# Patient Record
Sex: MALE | Race: WHITE | Employment: OTHER | URBAN - METROPOLITAN AREA
[De-identification: names, ages, dates, MRNs, and addresses within clinical notes are randomized per-mention and may not be internally consistent; named-entity substitution may affect disease eponyms.]

---

## 2019-04-26 LAB
CREATININE, EXTERNAL: 0.95
LDL-C, EXTERNAL: 85

## 2019-10-22 LAB
HBA1C MFR BLD HPLC: NORMAL %
MICROALBUMIN UR TEST STR-MCNC: NORMAL MG/DL

## 2020-10-08 VITALS
OXYGEN SATURATION: 98 % | WEIGHT: 218 LBS | DIASTOLIC BLOOD PRESSURE: 80 MMHG | SYSTOLIC BLOOD PRESSURE: 118 MMHG | BODY MASS INDEX: 31.21 KG/M2 | RESPIRATION RATE: 16 BRPM | TEMPERATURE: 97.8 F | HEART RATE: 88 BPM | HEIGHT: 70 IN

## 2020-10-12 ENCOUNTER — OFFICE VISIT (OUTPATIENT)
Dept: FAMILY MEDICINE CLINIC | Age: 75
End: 2020-10-12
Payer: MEDICARE

## 2020-10-12 VITALS
DIASTOLIC BLOOD PRESSURE: 72 MMHG | HEART RATE: 78 BPM | HEIGHT: 70 IN | TEMPERATURE: 97 F | OXYGEN SATURATION: 99 % | SYSTOLIC BLOOD PRESSURE: 112 MMHG | WEIGHT: 217 LBS | BODY MASS INDEX: 31.07 KG/M2

## 2020-10-12 DIAGNOSIS — E11.9 TYPE 2 DIABETES MELLITUS WITHOUT COMPLICATION, WITHOUT LONG-TERM CURRENT USE OF INSULIN (HCC): Primary | ICD-10-CM

## 2020-10-12 DIAGNOSIS — I10 HYPERTENSION, UNSPECIFIED TYPE: ICD-10-CM

## 2020-10-12 DIAGNOSIS — G62.9 NEUROPATHY: ICD-10-CM

## 2020-10-12 DIAGNOSIS — I25.10 CORONARY ARTERIOSCLEROSIS: ICD-10-CM

## 2020-10-12 DIAGNOSIS — E55.9 VITAMIN D DEFICIENCY: ICD-10-CM

## 2020-10-12 DIAGNOSIS — K21.9 GASTROESOPHAGEAL REFLUX DISEASE, UNSPECIFIED WHETHER ESOPHAGITIS PRESENT: ICD-10-CM

## 2020-10-12 DIAGNOSIS — E78.2 MIXED HYPERLIPIDEMIA: ICD-10-CM

## 2020-10-12 PROBLEM — N52.9 PRIMARY ERECTILE DYSFUNCTION: Status: ACTIVE | Noted: 2018-10-01

## 2020-10-12 PROBLEM — M19.90 OSTEOARTHRITIS: Status: ACTIVE | Noted: 2017-04-27

## 2020-10-12 PROBLEM — F10.20 ALCOHOLISM (HCC): Status: ACTIVE | Noted: 2019-10-22

## 2020-10-12 PROBLEM — Z77.090 HISTORY OF ASBESTOS EXPOSURE: Status: ACTIVE | Noted: 2017-02-20

## 2020-10-12 PROBLEM — N20.0 KIDNEY STONE: Status: ACTIVE | Noted: 2018-10-01

## 2020-10-12 PROBLEM — E53.8 VITAMIN B12 DEFICIENCY (NON ANEMIC): Status: ACTIVE | Noted: 2017-02-20

## 2020-10-12 PROCEDURE — 99214 OFFICE O/P EST MOD 30 MIN: CPT | Performed by: STUDENT IN AN ORGANIZED HEALTH CARE EDUCATION/TRAINING PROGRAM

## 2020-10-12 RX ORDER — CLOPIDOGREL BISULFATE 75 MG/1
TABLET ORAL
COMMUNITY
Start: 2020-10-01

## 2020-10-12 RX ORDER — ATORVASTATIN CALCIUM 80 MG/1
TABLET, FILM COATED ORAL
COMMUNITY
Start: 2020-10-01 | End: 2021-01-04

## 2020-10-12 NOTE — PROGRESS NOTES
Subjective:     Chief Complaint   Patient presents with    Follow Up Chronic Condition     HPI:  Lara Munoz is a 67 y.o. male who presents for chronic conditions. Takes Pioglitazone for DM. Previous A1c was 5.6. Recently had albumin/creatinine ratio done and was within normal limits. Compliance with medication: Yes  Home blood glucose monitoring range: Checking inconsistently   Hypoglycemic episode(s): No  Statin: Yes   ACEI: No   ASA: yes   Ophthalmology: Yes, regularly, mo changes in vision  Podiatry: No  Dentistry: Yes, follows  Compliance with diet:No  Polyphagia/Polydypsia: No    Not checking blood pressure consistently at home. Recently had it checked and it was normal.    History of GERD. Was previously on Prilosec. Currently only taking Tums as needed. Overall well controlled. Complains of neuropathy in his left hand, and bilateral feet. States need occurs at night when he is lying down, will resolve quickly when he gets up. Is not very bothersome, and he does not want to see neurology. Patient has history of NSTEMI, follows with cardiology. Metoprolol is on his medication list but is not taking this. Went over the cardiology notes, and there was no instructions to take this medication. He is scheduled to see cardiology in December, and will talk to him about this. He is on Plavix, aspirin, statin.     Past Medical History:   Diagnosis Date    [facility] non-[facility]) [facility]     Alcoholism ([facility] 10/22/2019    Chest pain     Coronary arteriosclerosis     Diabetes (HCC)     Type II, no insulin    Elevated PSA     GERD (gastroesophageal reflux disease)     History of asbestos exposure 2/20/2017    Hypertension     Kidney stone 10/1/2018    Kidney stones     Mixed hyperlipidemia 2/20/2017    Osteoarthritis 4/27/2017    Presence of coronary angioplasty implant and graft 9/1/2016    Primary erectile dysfunction 10/1/2018    Prostate cancer ([facility]     Type 2 diabetes mellitus without complication, without long-term current use of insulin ([facility] 2017    Vitamin B12 deficiency (non anemic) 2017    Wears dentures     upper     Family History   Problem Relation Age of Onset    Heart Disease Mother     Cancer Father         leg cancer that spread     Social History     Socioeconomic History    Marital status:      Spouse name: Not on file    Number of children: Not on file    Years of education: Not on file    Highest education level: Not on file   Occupational History    Not on file   Social Needs    Financial resource strain: Not on file    Food insecurity     Worry: Not on file     Inability: Not on file    Transportation needs     Medical: Not on file     Non-medical: Not on file   Tobacco Use    Smoking status: Current Some Day Smoker     Packs/day: 2.00     Years: 50.00     Pack years: 100.00     Last attempt to quit: 2007     Years since quittin.7    Smokeless tobacco: Former User   Substance and Sexual Activity    Alcohol use:  Yes     Alcohol/week: 7.0 standard drinks     Types: 7 Cans of beer per week    Drug use: Never    Sexual activity: Not on file   Lifestyle    Physical activity     Days per week: Not on file     Minutes per session: Not on file    Stress: Not on file   Relationships    Social connections     Talks on phone: Not on file     Gets together: Not on file     Attends Rastafarian service: Not on file     Active member of club or organization: Not on file     Attends meetings of clubs or organizations: Not on file     Relationship status: Not on file    Intimate partner violence     Fear of current or ex partner: Not on file     Emotionally abused: Not on file     Physically abused: Not on file     Forced sexual activity: Not on file   Other Topics Concern    Not on file   Social History Narrative    Not on file     Current Outpatient Medications on File Prior to Visit   Medication Sig Dispense Refill    VITAMIN B COMPLEX PO Take 1 Tab by mouth daily.  aspirin-calcium carbonate 81 mg-300 mg calcium(777 mg) tab Take 81 mg by mouth daily.  atorvastatin (LIPITOR) 80 mg tablet take 1 tablet by mouth once daily      clopidogreL (PLAVIX) 75 mg tab take 1 tablet by mouth once daily      nitroglycerin (NITROSTAT) 0.4 mg SL tablet 0.4 mg by SubLINGual route every five (5) minutes as needed for Chest Pain. Chest pain      ergocalciferol (ERGOCALCIFEROL) 50,000 unit capsule Take 50,000 Units by mouth every thirty (30) days.  pioglitazone (ACTOS) 30 mg tablet Take 30 mg by mouth daily.  [DISCONTINUED] metoprolol (LOPRESSOR) 25 mg tablet Take 25 mg by mouth two (2) times a day.  [DISCONTINUED] pantoprazole (PROTONIX) 40 mg tablet Take 40 mg by mouth two (2) times a day.  [DISCONTINUED] rosuvastatin (CRESTOR) 20 mg tablet Take 20 mg by mouth nightly. No current facility-administered medications on file prior to visit. No Known Allergies  Review of Systems   Constitutional: Negative for chills and fever. Respiratory: Negative for cough. Cardiovascular: Negative for chest pain. Gastrointestinal: Positive for heartburn. Neurological:        As stated in HPI           Objective:     Vitals:    10/12/20 1038   BP: 112/72   Pulse: 78   Temp: 97 °F (36.1 °C)   TempSrc: Temporal   SpO2: 99%   Weight: 217 lb (98.4 kg)   Height: 5' 9.5\" (1.765 m)     Physical Exam  Vitals signs reviewed. Constitutional:       Appearance: Normal appearance. HENT:      Head: Normocephalic and atraumatic. Neck:      Musculoskeletal: Neck supple. Cardiovascular:      Rate and Rhythm: Normal rate and regular rhythm. Heart sounds: No murmur. Pulmonary:      Effort: Pulmonary effort is normal.      Breath sounds: Normal breath sounds. No wheezing. Abdominal:      General: Abdomen is flat. Palpations: Abdomen is soft. Tenderness:  There is no abdominal tenderness. Skin:     General: Skin is warm and dry. Neurological:      Mental Status: He is alert and oriented to person, place, and time. Mental status is at baseline. Comments: Left wrist: Negative Tinel, negative Phalen    Left elbow: Negative cubital tunnel test    Normal monofilament test and see bilaterally. Psychiatric:         Mood and Affect: Mood normal.            Assessment/Plan:       1. Type 2 diabetes mellitus without complication, without long-term current use of insulin ([address]      -     Continue current management. Follow-up labs. Advised diet (provided handout) and exercise. -     METABOLIC PANEL, COMPREHENSIVE  -     HEMOGLOBIN A1C WITH EAG  -      DIABETES FOOT EXAM    2. Gastroesophageal reflux disease, unspecified whether esophagitis present  -     Continue current management. Can re, or H2 antagonist as needed. Add a PPI    3. Vitamin D deficiency  -     Continue current management. Follow-up labs. She    4. Neuropathy  -     TSH 3RD GENERATION  -     CBC WITH AUTOMATED DIFF  -     METABOLIC PANEL, COMPREHENSIVE  -     VITAMIN B12  -     MAGNESIUM  -     SED RATE (ESR)    5. Mixed hyperlipidemia  -     METABOLIC PANEL, COMPREHENSIVE  -     LIPID PANEL    6. Coronary arteriosclerosis  -Follow-up with cardiology. Will ask about beta blocker. 7. Hypertension, unspecified type  -     Continue current management. Follow-up labs. Advised to check blood pressure at least 3 times weekly. Follow-up and Dispositions    · Return in about 6 months (around 4/12/2021) for Wellness.          Shante Berman MD

## 2020-10-12 NOTE — PATIENT INSTRUCTIONS

## 2020-10-13 LAB
ALBUMIN SERPL-MCNC: 4.3 G/DL (ref 3.7–4.7)
ALBUMIN/GLOB SERPL: 1.8 {RATIO} (ref 1.2–2.2)
ALP SERPL-CCNC: 85 IU/L (ref 39–117)
ALT SERPL-CCNC: 14 IU/L (ref 0–44)
AST SERPL-CCNC: 15 IU/L (ref 0–40)
BASOPHILS # BLD AUTO: 0.1 X10E3/UL (ref 0–0.2)
BASOPHILS NFR BLD AUTO: 1 %
BILIRUB SERPL-MCNC: 0.5 MG/DL (ref 0–1.2)
BUN SERPL-MCNC: 12 MG/DL (ref 8–27)
BUN/CREAT SERPL: 15 (ref 10–24)
CALCIUM SERPL-MCNC: 9.6 MG/DL (ref 8.6–10.2)
CHLORIDE SERPL-SCNC: 102 MMOL/L (ref 96–106)
CHOLEST SERPL-MCNC: 133 MG/DL (ref 100–199)
CO2 SERPL-SCNC: 25 MMOL/L (ref 20–29)
CREAT SERPL-MCNC: 0.82 MG/DL (ref 0.76–1.27)
EOSINOPHIL # BLD AUTO: 0 X10E3/UL (ref 0–0.4)
EOSINOPHIL NFR BLD AUTO: 1 %
ERYTHROCYTE [DISTWIDTH] IN BLOOD BY AUTOMATED COUNT: 13.4 % (ref 11.6–15.4)
ERYTHROCYTE [SEDIMENTATION RATE] IN BLOOD BY WESTERGREN METHOD: 13 MM/HR (ref 0–30)
EST. AVERAGE GLUCOSE BLD GHB EST-MCNC: 111 MG/DL
GLOBULIN SER CALC-MCNC: 2.4 G/DL (ref 1.5–4.5)
GLUCOSE SERPL-MCNC: 88 MG/DL (ref 65–99)
HBA1C MFR BLD: 5.5 % (ref 4.8–5.6)
HCT VFR BLD AUTO: 46.1 % (ref 37.5–51)
HDLC SERPL-MCNC: 40 MG/DL
HGB BLD-MCNC: 15.3 G/DL (ref 13–17.7)
IMM GRANULOCYTES # BLD AUTO: 0 X10E3/UL (ref 0–0.1)
IMM GRANULOCYTES NFR BLD AUTO: 0 %
LDLC SERPL CALC-MCNC: 76 MG/DL (ref 0–99)
LYMPHOCYTES # BLD AUTO: 1.3 X10E3/UL (ref 0.7–3.1)
LYMPHOCYTES NFR BLD AUTO: 27 %
MAGNESIUM SERPL-MCNC: 2.1 MG/DL (ref 1.6–2.3)
MCH RBC QN AUTO: 30.7 PG (ref 26.6–33)
MCHC RBC AUTO-ENTMCNC: 33.2 G/DL (ref 31.5–35.7)
MCV RBC AUTO: 92 FL (ref 79–97)
MONOCYTES # BLD AUTO: 0.4 X10E3/UL (ref 0.1–0.9)
MONOCYTES NFR BLD AUTO: 9 %
NEUTROPHILS # BLD AUTO: 3 X10E3/UL (ref 1.4–7)
NEUTROPHILS NFR BLD AUTO: 62 %
PLATELET # BLD AUTO: 252 X10E3/UL (ref 150–450)
POTASSIUM SERPL-SCNC: 4.4 MMOL/L (ref 3.5–5.2)
PROT SERPL-MCNC: 6.7 G/DL (ref 6–8.5)
RBC # BLD AUTO: 4.99 X10E6/UL (ref 4.14–5.8)
SODIUM SERPL-SCNC: 141 MMOL/L (ref 134–144)
TRIGL SERPL-MCNC: 86 MG/DL (ref 0–149)
TSH SERPL DL<=0.005 MIU/L-ACNC: 2.2 UIU/ML (ref 0.45–4.5)
VIT B12 SERPL-MCNC: 457 PG/ML (ref 232–1245)
VLDLC SERPL CALC-MCNC: 17 MG/DL (ref 5–40)
WBC # BLD AUTO: 4.8 X10E3/UL (ref 3.4–10.8)

## 2020-10-17 LAB
25(OH)D3+25(OH)D2 SERPL-MCNC: 38.1 NG/ML (ref 30–100)
SPECIMEN STATUS REPORT, ROLRST: NORMAL

## 2021-04-12 ENCOUNTER — OFFICE VISIT (OUTPATIENT)
Dept: FAMILY MEDICINE CLINIC | Age: 76
End: 2021-04-12
Payer: MEDICARE

## 2021-04-12 VITALS
WEIGHT: 222 LBS | BODY MASS INDEX: 32.31 KG/M2 | SYSTOLIC BLOOD PRESSURE: 115 MMHG | HEART RATE: 65 BPM | TEMPERATURE: 96.9 F | DIASTOLIC BLOOD PRESSURE: 70 MMHG | OXYGEN SATURATION: 98 %

## 2021-04-12 DIAGNOSIS — Z13.39 ALCOHOL SCREENING: ICD-10-CM

## 2021-04-12 DIAGNOSIS — F10.20 ALCOHOLISM (HCC): ICD-10-CM

## 2021-04-12 DIAGNOSIS — Z00.00 WELLNESS EXAMINATION: Primary | ICD-10-CM

## 2021-04-12 DIAGNOSIS — E11.9 TYPE 2 DIABETES MELLITUS WITHOUT COMPLICATION, WITHOUT LONG-TERM CURRENT USE OF INSULIN (HCC): ICD-10-CM

## 2021-04-12 DIAGNOSIS — E78.00 PURE HYPERCHOLESTEROLEMIA: ICD-10-CM

## 2021-04-12 DIAGNOSIS — Z95.5 S/P CORONARY ARTERY STENT PLACEMENT: ICD-10-CM

## 2021-04-12 DIAGNOSIS — Z13.31 DEPRESSION SCREENING: ICD-10-CM

## 2021-04-12 DIAGNOSIS — Z11.59 ENCOUNTER FOR HEPATITIS C SCREENING TEST FOR LOW RISK PATIENT: ICD-10-CM

## 2021-04-12 DIAGNOSIS — I25.10 CORONARY ARTERIOSCLEROSIS: ICD-10-CM

## 2021-04-12 PROCEDURE — 1101F PT FALLS ASSESS-DOCD LE1/YR: CPT | Performed by: STUDENT IN AN ORGANIZED HEALTH CARE EDUCATION/TRAINING PROGRAM

## 2021-04-12 PROCEDURE — G8510 SCR DEP NEG, NO PLAN REQD: HCPCS | Performed by: STUDENT IN AN ORGANIZED HEALTH CARE EDUCATION/TRAINING PROGRAM

## 2021-04-12 PROCEDURE — G0439 PPPS, SUBSEQ VISIT: HCPCS | Performed by: STUDENT IN AN ORGANIZED HEALTH CARE EDUCATION/TRAINING PROGRAM

## 2021-04-12 PROCEDURE — G8754 DIAS BP LESS 90: HCPCS | Performed by: STUDENT IN AN ORGANIZED HEALTH CARE EDUCATION/TRAINING PROGRAM

## 2021-04-12 PROCEDURE — G8427 DOCREV CUR MEDS BY ELIG CLIN: HCPCS | Performed by: STUDENT IN AN ORGANIZED HEALTH CARE EDUCATION/TRAINING PROGRAM

## 2021-04-12 PROCEDURE — G8417 CALC BMI ABV UP PARAM F/U: HCPCS | Performed by: STUDENT IN AN ORGANIZED HEALTH CARE EDUCATION/TRAINING PROGRAM

## 2021-04-12 PROCEDURE — G8536 NO DOC ELDER MAL SCRN: HCPCS | Performed by: STUDENT IN AN ORGANIZED HEALTH CARE EDUCATION/TRAINING PROGRAM

## 2021-04-12 PROCEDURE — G8752 SYS BP LESS 140: HCPCS | Performed by: STUDENT IN AN ORGANIZED HEALTH CARE EDUCATION/TRAINING PROGRAM

## 2021-04-12 NOTE — PROGRESS NOTES
Medicare Wellness Exam:    Chief Complaint   Patient presents with    Labs     6 month f/ufor blood work, cholesterol & sugar    Annual Wellness Visit     Medicare Wellness     he is a 67y.o. year old male who presents for evaluation for their Medicare Wellness Visit. Fall Screen is completed and assessed=yes. Depression Screen is completed and assessed=yes  Medication list reviewed and adjusted for accuracy=yes  Immunizations reviewed and updated=yes  Health/Preventative Screenings reviewed and updated=yes  ADL Functions reviewed=yes  MiniCog Score= 5/5 (2 points for clock and 3/3 points for recall)   See scanned medicare wellness documents for full details. Patient Active Problem List    Diagnosis    Acute non-ST elevation myocardial infarction ([facility]STEMI) ([facility]    Chest pain    Coronary arteriosclerosis    Hypertension    Alcoholism ([facility]    Kidney stone    Primary erectile dysfunction    Osteoarthritis    Vitamin B12 deficiency (non anemic)    History of asbestos exposure    Mixed hyperlipidemia    Type 2 diabetes mellitus without complication, without long-term current use of insulin (HCC)    Presence of coronary angioplasty implant and graft       Reviewed PmHx, RxHx, FmHx, SocHx, AllgHx and updated and dated in the chart. Not checking blood pressure consistently at home. Recently had it checked and it was normal.     History of GERD. Was previously on Prilosec. Currently only taking Tums as needed. Overall well controlled.     Complains of neuropathy in his left hand, and bilateral feet. States need occurs at night when he is lying down, will resolve quickly when he gets up. Is not very bothersome, and he does not want to see neurology.     Patient has history of chest pain, CAD, had stent placed in LCx in 2016. Follows with cardiology. Recently Plavix was stopped. He is currently on statin, aspirin, and as needed Nitrostat. Has not needed Nitrostat in a long time.     He abuses alcohol. Drinks about 6 beers a day. He is not interested in quitting. None    Refuses all vaccinations including pneumonia, Covid, Tdap, flu. History of diabetes on pioglitazone. Last A1c was 5.5. Interested in starting medication if A1c remains uncontrolled. Review of Systems   All other systems reviewed and are negative. Objective:     Vitals:    04/12/21 0853   BP: 115/70   Pulse: 65   Temp: 96.9 °F (36.1 °C)   TempSrc: Temporal   SpO2: 98%   Weight: 222 lb (100.7 kg)     Physical Exam  Vitals signs reviewed. Constitutional:       Appearance: Normal appearance. HENT:      Head: Normocephalic and atraumatic. Cardiovascular:      Rate and Rhythm: Normal rate. Heart sounds: Normal heart sounds. Pulmonary:      Effort: Pulmonary effort is normal.      Breath sounds: Normal breath sounds. Abdominal:      Palpations: Abdomen is soft. Tenderness: There is no abdominal tenderness. Neurological:      Mental Status: He is alert and oriented to person, place, and time. Psychiatric:         Behavior: Behavior normal.          Assessment/ Plan:       Wellness examination    -     Update HCM. Follow-up labs. Refusing all vaccinations. Alcoholism ([address]  -Discussed alcohol intake which includes about 6 beers a day. At this point is not interested in quitting. Type 2 diabetes mellitus without complication, without long-term current use of insulin ([address]        -     May stop pioglitazone of A1c continues controlled  -     CBC WITH AUTOMATED DIFF  -     METABOLIC PANEL, COMPREHENSIVE  -     HEMOGLOBIN A1C WITH EAG    Depression screening    Alcohol screening     Coronary arteriosclerosis  S/P coronary artery stent placement in 2016  -Heart recently stopped. Currently on statin and aspirin. Rarely uses Nitrostat, has not used in a long time.   -     Follows with cardiology  -     LIPID PANEL     Pure hypercholesterolemia  -     CBC WITH AUTOMATED DIFF  -     LIPID PANEL    Hypertension, unspecified type  -     Continue current management. Follow-up labs. Advised to check blood pressure at least 3 times weekly. Follow-up and Dispositions    · Return in about 6 months (around 10/12/2021) for Chronic Conditions.                -Pain evaluation performed in office  -Cognitive Screen performed in office  -Depression Screen, Fall risks  and ADL functionality were addressed  -Medication list updated and reviewed for any changes   -A comprehensive review of medical issues and a plan was formulated  -End of life planning was addressed with pt   -Health Screenings for preventions were addressed and a plan was formulated  -Shingles Vaccine was recommended  -Discussed with patient cancer risk factors and appropriate screenings for age  -Patient evaluated for colonoscopy and referred if needed per screeing criteria  -Labs from previous visits were discussed with patient   -Discussed with patient diet and exercise and formulated a plan as needed  -Advanced care planning was addressed with the patient.  -Alcohol screening performed and was negative    -    I have discussed the diagnosis with the patient and the intended plan as seen in the above orders. The patient understands and agrees with the plan. The patient has received an after-visit summary and questions were answered concerning future plans. Medication Side Effects and Warnings were discussed with patien  Patient Labs were reviewed and or requested  Patient Past Records were reviewed and or requested    There are no Patient Instructions on file for this visit.       Tyler Henderson MD

## 2021-04-12 NOTE — PROGRESS NOTES
Chief Complaint   Patient presents with    Labs     6 month f/ufor blood work, cholesterol & sugar     1. Have you been to the ER, urgent care clinic since your last visit? Hospitalized since your last visit?no    2. Have you seen or consulted any other health care providers outside of the [address] [place] [address] since your last visit? Include any pap smears or colon screening.  No     3 most recent PHQ Screens 4/12/2021   Little interest or pleasure in doing things Not at all   Feeling down, depressed, irritable, or hopeless Not at all   Total Score PHQ 2 0

## 2021-04-12 NOTE — PATIENT INSTRUCTIONS

## 2021-04-13 LAB
ALBUMIN SERPL-MCNC: 4.2 G/DL (ref 3.7–4.7)
ALBUMIN/GLOB SERPL: 2 {RATIO} (ref 1.2–2.2)
ALP SERPL-CCNC: 71 IU/L (ref 39–117)
ALT SERPL-CCNC: 16 IU/L (ref 0–44)
AST SERPL-CCNC: 20 IU/L (ref 0–40)
BASOPHILS # BLD AUTO: 0 X10E3/UL (ref 0–0.2)
BASOPHILS NFR BLD AUTO: 1 %
BILIRUB SERPL-MCNC: 0.5 MG/DL (ref 0–1.2)
BUN SERPL-MCNC: 11 MG/DL (ref 8–27)
BUN/CREAT SERPL: 15 (ref 10–24)
CALCIUM SERPL-MCNC: 9.5 MG/DL (ref 8.6–10.2)
CHLORIDE SERPL-SCNC: 102 MMOL/L (ref 96–106)
CHOLEST SERPL-MCNC: 119 MG/DL (ref 100–199)
CO2 SERPL-SCNC: 24 MMOL/L (ref 20–29)
CREAT SERPL-MCNC: 0.74 MG/DL (ref 0.76–1.27)
EOSINOPHIL # BLD AUTO: 0.1 X10E3/UL (ref 0–0.4)
EOSINOPHIL NFR BLD AUTO: 1 %
ERYTHROCYTE [DISTWIDTH] IN BLOOD BY AUTOMATED COUNT: 13.6 % (ref 11.6–15.4)
EST. AVERAGE GLUCOSE BLD GHB EST-MCNC: 111 MG/DL
GLOBULIN SER CALC-MCNC: 2.1 G/DL (ref 1.5–4.5)
GLUCOSE SERPL-MCNC: 92 MG/DL (ref 65–99)
HBA1C MFR BLD: 5.5 % (ref 4.8–5.6)
HCT VFR BLD AUTO: 43.6 % (ref 37.5–51)
HDLC SERPL-MCNC: 36 MG/DL
HGB BLD-MCNC: 14.8 G/DL (ref 13–17.7)
IMM GRANULOCYTES # BLD AUTO: 0 X10E3/UL (ref 0–0.1)
IMM GRANULOCYTES NFR BLD AUTO: 0 %
LDLC SERPL CALC-MCNC: 68 MG/DL (ref 0–99)
LYMPHOCYTES # BLD AUTO: 0.9 X10E3/UL (ref 0.7–3.1)
LYMPHOCYTES NFR BLD AUTO: 22 %
MCH RBC QN AUTO: 31.7 PG (ref 26.6–33)
MCHC RBC AUTO-ENTMCNC: 33.9 G/DL (ref 31.5–35.7)
MCV RBC AUTO: 93 FL (ref 79–97)
MONOCYTES # BLD AUTO: 0.4 X10E3/UL (ref 0.1–0.9)
MONOCYTES NFR BLD AUTO: 9 %
NEUTROPHILS # BLD AUTO: 2.7 X10E3/UL (ref 1.4–7)
NEUTROPHILS NFR BLD AUTO: 67 %
PLATELET # BLD AUTO: 264 X10E3/UL (ref 150–450)
POTASSIUM SERPL-SCNC: 4.1 MMOL/L (ref 3.5–5.2)
PROT SERPL-MCNC: 6.3 G/DL (ref 6–8.5)
RBC # BLD AUTO: 4.67 X10E6/UL (ref 4.14–5.8)
SODIUM SERPL-SCNC: 141 MMOL/L (ref 134–144)
TRIGL SERPL-MCNC: 70 MG/DL (ref 0–149)
VLDLC SERPL CALC-MCNC: 15 MG/DL (ref 5–40)
WBC # BLD AUTO: 4 X10E3/UL (ref 3.4–10.8)

## 2021-09-26 RX ORDER — ERGOCALCIFEROL 1.25 MG/1
CAPSULE ORAL
Qty: 3 CAPSULE | Refills: 0 | Status: SHIPPED | OUTPATIENT
Start: 2021-09-26 | End: 2021-12-26

## 2021-10-03 DIAGNOSIS — E11.9 TYPE 2 DIABETES MELLITUS WITHOUT COMPLICATION, WITHOUT LONG-TERM CURRENT USE OF INSULIN (HCC): Primary | ICD-10-CM

## 2021-10-04 RX ORDER — PIOGLITAZONEHYDROCHLORIDE 30 MG/1
TABLET ORAL
Qty: 90 TABLET | Refills: 1 | Status: SHIPPED | OUTPATIENT
Start: 2021-10-04 | End: 2022-04-02

## 2021-12-26 RX ORDER — ERGOCALCIFEROL 1.25 MG/1
CAPSULE ORAL
Qty: 3 CAPSULE | Refills: 0 | Status: SHIPPED | OUTPATIENT
Start: 2021-12-26 | End: 2022-03-28

## 2022-01-02 RX ORDER — ATORVASTATIN CALCIUM 80 MG/1
TABLET, FILM COATED ORAL
Qty: 90 TABLET | Refills: 3 | Status: SHIPPED | OUTPATIENT
Start: 2022-01-02

## 2022-02-14 ENCOUNTER — NURSE TRIAGE (OUTPATIENT)
Dept: OTHER | Facility: CLINIC | Age: 77
End: 2022-02-14

## 2022-02-14 NOTE — TELEPHONE ENCOUNTER
Received call from [facility] at [facility] with Red Flag Complaint. Subjective: Caller states \"SOB, chest congestion\"     Current Symptoms:   Chest congestion  SOB and wheezing until he coughs up white phlegm  \"Rattling\" with breathing   Frequent coughing episodes, every 15 minutes  Weakness  Muscle aches    Onset:  yesterday; worsening overnight     Associated Symptoms: NA    Pain Severity: denies    Temperature: denies     What has been tried: inhaler-helping a little bit    LMP: NA Pregnant: NA    Recommended disposition: go to office now, advised caller if unable to get an appointment in the suggested time frame to go to an [address], caller agreeable. Care advice provided, patient verbalizes understanding; denies any other questions or concerns; instructed to call back for any new or worsening symptoms. Writer provided warm transfer to Page Santiago at [facility] for appointment scheduling. Attention Provider: Thank you for allowing me to participate in the care of your patient. The patient was connected to triage in response to information provided to the [facility]. Please do not respond through this encounter as the response is not directed to a shared pool.     Reason for Disposition   Wheezing is present    Protocols used: [id number]

## 2022-03-18 PROBLEM — F10.20 ALCOHOLISM (HCC): Status: ACTIVE | Noted: 2019-10-22

## 2022-03-18 PROBLEM — N52.9 PRIMARY ERECTILE DYSFUNCTION: Status: ACTIVE | Noted: 2018-10-01

## 2022-03-19 PROBLEM — N20.0 KIDNEY STONE: Status: ACTIVE | Noted: 2018-10-01

## 2022-03-19 PROBLEM — M19.90 OSTEOARTHRITIS: Status: ACTIVE | Noted: 2017-04-27

## 2022-03-19 PROBLEM — E53.8 VITAMIN B12 DEFICIENCY (NON ANEMIC): Status: ACTIVE | Noted: 2017-02-20

## 2022-03-19 PROBLEM — E78.2 MIXED HYPERLIPIDEMIA: Status: ACTIVE | Noted: 2017-02-20

## 2022-03-19 PROBLEM — E11.9 TYPE 2 DIABETES MELLITUS WITHOUT COMPLICATION, WITHOUT LONG-TERM CURRENT USE OF INSULIN (HCC): Status: ACTIVE | Noted: 2017-02-20

## 2022-03-20 PROBLEM — Z77.090 HISTORY OF ASBESTOS EXPOSURE: Status: ACTIVE | Noted: 2017-02-20

## 2022-03-28 RX ORDER — ERGOCALCIFEROL 1.25 MG/1
CAPSULE ORAL
Qty: 3 CAPSULE | Refills: 0 | Status: SHIPPED | OUTPATIENT
Start: 2022-03-28 | End: 2022-06-24

## 2022-04-02 DIAGNOSIS — E11.9 TYPE 2 DIABETES MELLITUS WITHOUT COMPLICATION, WITHOUT LONG-TERM CURRENT USE OF INSULIN (HCC): ICD-10-CM

## 2022-04-02 RX ORDER — PIOGLITAZONEHYDROCHLORIDE 30 MG/1
TABLET ORAL
Qty: 90 TABLET | Refills: 1 | Status: SHIPPED | OUTPATIENT
Start: 2022-04-02 | End: 2022-09-28

## 2022-06-24 RX ORDER — ERGOCALCIFEROL 1.25 MG/1
CAPSULE ORAL
Qty: 3 CAPSULE | Refills: 0 | Status: SHIPPED | OUTPATIENT
Start: 2022-06-24 | End: 2022-09-22

## 2022-09-22 RX ORDER — ERGOCALCIFEROL 1.25 MG/1
CAPSULE ORAL
Qty: 3 CAPSULE | Refills: 0 | Status: SHIPPED | OUTPATIENT
Start: 2022-09-22

## 2022-09-23 ENCOUNTER — TELEPHONE (OUTPATIENT)
Dept: FAMILY MEDICINE CLINIC | Age: 77
End: 2022-09-23

## 2022-09-23 NOTE — TELEPHONE ENCOUNTER
----- Message from Fermin Barbosa sent at 9/23/2022 11:54 AM EDT -----  Subject: Referral Request    Reason for referral request? labs  Provider patient wants to be referred to(if known):     Provider Phone Number(if known):     Additional Information for Provider?   ---------------------------------------------------------------------------  --------------  [address]    [phone]; OK to leave message on voicemail  ---------------------------------------------------------------------------  --------------

## 2022-09-23 NOTE — TELEPHONE ENCOUNTER
Left message advising pt that he is scheduled for a visit on 9/26/22 at 8:30 and to arrive 15min prior and that Dr. Tee Cortez would be doing lab work at that appointment.

## 2022-09-26 ENCOUNTER — OFFICE VISIT (OUTPATIENT)
Dept: FAMILY MEDICINE CLINIC | Age: 77
End: 2022-09-26
Payer: MEDICARE

## 2022-09-26 VITALS
BODY MASS INDEX: 31.78 KG/M2 | SYSTOLIC BLOOD PRESSURE: 138 MMHG | RESPIRATION RATE: 16 BRPM | WEIGHT: 222 LBS | DIASTOLIC BLOOD PRESSURE: 73 MMHG | HEIGHT: 70 IN | HEART RATE: 91 BPM | TEMPERATURE: 97.8 F

## 2022-09-26 DIAGNOSIS — Z12.5 SCREENING PSA (PROSTATE SPECIFIC ANTIGEN): ICD-10-CM

## 2022-09-26 DIAGNOSIS — I25.10 CORONARY ARTERIOSCLEROSIS: ICD-10-CM

## 2022-09-26 DIAGNOSIS — Z11.59 NEED FOR HEPATITIS C SCREENING TEST: ICD-10-CM

## 2022-09-26 DIAGNOSIS — E78.2 MIXED HYPERLIPIDEMIA: ICD-10-CM

## 2022-09-26 DIAGNOSIS — R32 URINARY INCONTINENCE, UNSPECIFIED TYPE: ICD-10-CM

## 2022-09-26 DIAGNOSIS — M19.90 ARTHRITIS: ICD-10-CM

## 2022-09-26 DIAGNOSIS — I21.4 ACUTE NON-ST ELEVATION MYOCARDIAL INFARCTION (NSTEMI) (HCC): ICD-10-CM

## 2022-09-26 DIAGNOSIS — F10.20 ALCOHOLISM (HCC): ICD-10-CM

## 2022-09-26 DIAGNOSIS — E53.8 VITAMIN B12 DEFICIENCY (NON ANEMIC): ICD-10-CM

## 2022-09-26 DIAGNOSIS — E11.9 TYPE 2 DIABETES MELLITUS WITHOUT COMPLICATION, WITHOUT LONG-TERM CURRENT USE OF INSULIN (HCC): Primary | ICD-10-CM

## 2022-09-26 PROCEDURE — G8427 DOCREV CUR MEDS BY ELIG CLIN: HCPCS | Performed by: STUDENT IN AN ORGANIZED HEALTH CARE EDUCATION/TRAINING PROGRAM

## 2022-09-26 PROCEDURE — G8752 SYS BP LESS 140: HCPCS | Performed by: STUDENT IN AN ORGANIZED HEALTH CARE EDUCATION/TRAINING PROGRAM

## 2022-09-26 PROCEDURE — G8510 SCR DEP NEG, NO PLAN REQD: HCPCS | Performed by: STUDENT IN AN ORGANIZED HEALTH CARE EDUCATION/TRAINING PROGRAM

## 2022-09-26 PROCEDURE — 1101F PT FALLS ASSESS-DOCD LE1/YR: CPT | Performed by: STUDENT IN AN ORGANIZED HEALTH CARE EDUCATION/TRAINING PROGRAM

## 2022-09-26 PROCEDURE — G8536 NO DOC ELDER MAL SCRN: HCPCS | Performed by: STUDENT IN AN ORGANIZED HEALTH CARE EDUCATION/TRAINING PROGRAM

## 2022-09-26 PROCEDURE — G8417 CALC BMI ABV UP PARAM F/U: HCPCS | Performed by: STUDENT IN AN ORGANIZED HEALTH CARE EDUCATION/TRAINING PROGRAM

## 2022-09-26 PROCEDURE — 1123F ACP DISCUSS/DSCN MKR DOCD: CPT | Performed by: STUDENT IN AN ORGANIZED HEALTH CARE EDUCATION/TRAINING PROGRAM

## 2022-09-26 PROCEDURE — G8754 DIAS BP LESS 90: HCPCS | Performed by: STUDENT IN AN ORGANIZED HEALTH CARE EDUCATION/TRAINING PROGRAM

## 2022-09-26 PROCEDURE — 99214 OFFICE O/P EST MOD 30 MIN: CPT | Performed by: STUDENT IN AN ORGANIZED HEALTH CARE EDUCATION/TRAINING PROGRAM

## 2022-09-26 RX ORDER — GUAIFENESIN 100 MG/5ML
81 LIQUID (ML) ORAL DAILY
COMMUNITY

## 2022-09-26 RX ORDER — ACETAMINOPHEN 500 MG
500 TABLET ORAL
Qty: 60 TABLET | Refills: 3 | Status: SHIPPED | OUTPATIENT
Start: 2022-09-26

## 2022-09-26 NOTE — PROGRESS NOTES
Subjective:     Chief Complaint   Patient presents with    Follow-up    Diabetes     HPI:  Brissa Vivas is a 65 y.o. male who presents for follow-up of diabetes chronic conditions. He has history of CAD/NSTEMI and follows with cardiology. Takes Plavix, and aspirin. History of HTN and medication stated below. Patient has arthritis and complains of pain in his knees, as well as a history of surgery of his right knee. Does not take pain medication. Patient admits once again to drinking about 5 beers a day. Has been treated for diabetes a day for about 30 years. He is not interested in alcohol cessation.   Patient Active Problem List    Diagnosis    Acute non-ST elevation myocardial infarction (NSTEMI) ([address]    Coronary arteriosclerosis    Hypertension    Alcoholism ([address]    Kidney stone    Primary erectile dysfunction    Osteoarthritis    Vitamin B12 deficiency (non anemic)    History of asbestos exposure    Mixed hyperlipidemia    Type 2 diabetes mellitus without complication, without long-term current use of insulin ([address]     Past Medical History:   Diagnosis Date    Acute non-ST elevation myocardial infarction (NSTEMI) ([address]     Alcoholism ([address] 10/22/2019    Chest pain     CHF (congestive heart failure) ([address] 2016    EF 40 to 45% with distal inferior and inferiolateral hypokinesis    Coronary arteriosclerosis     Diabetes (HCC)     Type II, no insulin    Elevated PSA     GERD (gastroesophageal reflux disease)     History of asbestos exposure 2/20/2017    Hypertension     Kidney stone 10/1/2018    Kidney stones     Mixed hyperlipidemia 2/20/2017    Osteoarthritis 4/27/2017    Presence of coronary angioplasty implant and graft 9/1/2016    Primary erectile dysfunction 10/1/2018    Prostate cancer ([address]     Type 2 diabetes mellitus without complication, without long-term current use of insulin ([address] 2/20/2017    Vitamin B12 deficiency (non anemic) 2/20/2017    Wears dentures     upper     Family History   Problem Relation Age of Onset    Heart Disease Mother     Cancer Father         leg cancer that spread      reports that he quit smoking about 15 years ago. His smoking use included cigarettes. He has a 100.00 pack-year smoking history. He has quit using smokeless tobacco. He reports current alcohol use of about 7.0 standard drinks per week. He reports that he does not use drugs. Current Outpatient Medications on File Prior to Visit   Medication Sig Dispense Refill    Vitamin D2 1,250 mcg (50,000 unit) capsule take 1 capsule by mouth every MONTH 3 Capsule 0    pioglitazone (ACTOS) 30 mg tablet take 1 tablet by mouth once daily PLEASE MAKE APPOINTMENT WITH OFFICE 90 Tablet 1    atorvastatin (LIPITOR) 80 mg tablet take 1 tablet by mouth once daily 90 Tablet 3    VITAMIN B COMPLEX PO Take 1 Tab by mouth daily. clopidogreL (PLAVIX) 75 mg tab take 1 tablet by mouth once daily      nitroglycerin (NITROSTAT) 0.4 mg SL tablet 0.4 mg by SubLINGual route every five (5) minutes as needed for Chest Pain. Chest pain      aspirin (Hu Chewable Aspirin) 81 mg chewable tablet Take 81 mg by mouth daily. [DISCONTINUED] aspirin-calcium carbonate 81 mg-300 mg calcium(777 mg) tab Take 81 mg by mouth daily. (Patient not taking: Reported on 9/26/2022)       No current facility-administered medications on file prior to visit. No Known Allergies  Review of Systems   All other systems reviewed and are negative. Objective:     Vitals:    09/26/22 0819   BP: 138/73   Pulse: 91   Resp: 16   Temp: 97.8 °F (36.6 °C)   TempSrc: Axillary   Weight: 222 lb (100.7 kg)   Height: 5' 9.5\" (1.765 m)     Physical Exam  Vitals reviewed. HENT:      Head: Normocephalic and atraumatic. Cardiovascular:      Rate and Rhythm: Normal rate and regular rhythm. Heart sounds: Normal heart sounds. Pulmonary:      Effort: Pulmonary effort is normal.      Breath sounds: Normal breath sounds.    Neurological:      Mental Status: He is oriented to person, place, and time. Psychiatric:         Behavior: Behavior normal.          Assessment/Plan:       ICD-10-CM ICD-9-CM    1. Type 2 diabetes mellitus without complication, without long-term current use of insulin (HCC)  E11.9 250.00 MICROALBUMIN, UR, RAND W/ MICROALB/CREAT RATIO      CBC WITH AUTOMATED DIFF      HEMOGLOBIN A1C WITH EAG      2. Need for hepatitis C screening test  Z11.59 V73.89 HEPATITIS C AB      3. Mixed hyperlipidemia  E78.2 272.2 CBC WITH AUTOMATED DIFF      METABOLIC PANEL, COMPREHENSIVE      LIPID PANEL      4. Vitamin B12 deficiency (non anemic)  E53.8 266.2 VITAMIN B12      5. Screening PSA (prostate specific antigen)  Z12.5 V76.44       6. Arthritis  M19.90 716.90 acetaminophen (Tylenol Extra Strength) 500 mg tablet      7. Alcoholism ([address]  F10.20 303.90         Diabetes mellitus-weight current diet controlled. Last A1c was well controlled at 5.5. Follow-up labs. History of prostate cancer-status post prostatectomy. [facility] urology. Incontinence-secondary to history of prostate surgery. Scheduled to see urology soon. HLD-continue current management follow-up labs. History of B12 [id number] B12    Arthritis-due to DAPT use Tylenol was ordered  Alcoholism-drinks about 5 beers a day for the last 30 years. Once again advised to decrease his alcohol intake, but he is not interested. Liver enzymes normal when most recently checked. Follow-up and Dispositions    Return in about 6 months (around 3/26/2023) for Medicare wellness.           Candice Galindo MD

## 2022-09-26 NOTE — PROGRESS NOTES
Chief Complaint   Patient presents with    Follow-up    Diabetes     Visit Vitals  /73 (BP 1 Location: Left upper arm, BP Patient Position: Sitting)   Pulse 91   Temp 97.8 °F (36.6 °C) (Axillary)   Resp 16   Ht 5' 9.5\" (1.765 m)   Wt 222 lb (100.7 kg)   BMI 32.31 kg/m²     1. Have you been to the ER, urgent care clinic since your last visit? Hospitalized since your last visit? No    2. Have you seen or consulted any other health care providers outside of the [address] [place] [address] since your last visit? Include any pap smears or colon screening.  No

## 2022-09-27 LAB
ALBUMIN SERPL-MCNC: 4.4 G/DL (ref 3.7–4.7)
ALBUMIN/CREAT UR: <2 MG/G CREAT (ref 0–29)
ALBUMIN/GLOB SERPL: 1.8 {RATIO} (ref 1.2–2.2)
ALP SERPL-CCNC: 80 IU/L (ref 44–121)
ALT SERPL-CCNC: 17 IU/L (ref 0–44)
AST SERPL-CCNC: 17 IU/L (ref 0–40)
BASOPHILS # BLD AUTO: 0.1 X10E3/UL (ref 0–0.2)
BASOPHILS NFR BLD AUTO: 1 %
BILIRUB SERPL-MCNC: 0.4 MG/DL (ref 0–1.2)
BUN SERPL-MCNC: 20 MG/DL (ref 8–27)
BUN/CREAT SERPL: 22 (ref 10–24)
CALCIUM SERPL-MCNC: 9.3 MG/DL (ref 8.6–10.2)
CHLORIDE SERPL-SCNC: 101 MMOL/L (ref 96–106)
CHOLEST SERPL-MCNC: 165 MG/DL (ref 100–199)
CO2 SERPL-SCNC: 26 MMOL/L (ref 20–29)
CREAT SERPL-MCNC: 0.89 MG/DL (ref 0.76–1.27)
CREAT UR-MCNC: 128.1 MG/DL
EGFR: 88 ML/MIN/1.73
EOSINOPHIL # BLD AUTO: 0.1 X10E3/UL (ref 0–0.4)
EOSINOPHIL NFR BLD AUTO: 2 %
ERYTHROCYTE [DISTWIDTH] IN BLOOD BY AUTOMATED COUNT: 14 % (ref 11.6–15.4)
EST. AVERAGE GLUCOSE BLD GHB EST-MCNC: 117 MG/DL
GLOBULIN SER CALC-MCNC: 2.5 G/DL (ref 1.5–4.5)
GLUCOSE SERPL-MCNC: 100 MG/DL (ref 70–99)
HBA1C MFR BLD: 5.7 % (ref 4.8–5.6)
HCT VFR BLD AUTO: 44.6 % (ref 37.5–51)
HCV AB S/CO SERPL IA: <0.1 S/CO RATIO (ref 0–0.9)
HDLC SERPL-MCNC: 62 MG/DL
HGB BLD-MCNC: 14.7 G/DL (ref 13–17.7)
IMM GRANULOCYTES # BLD AUTO: 0 X10E3/UL (ref 0–0.1)
IMM GRANULOCYTES NFR BLD AUTO: 0 %
LDLC SERPL CALC-MCNC: 91 MG/DL (ref 0–99)
LYMPHOCYTES # BLD AUTO: 1.1 X10E3/UL (ref 0.7–3.1)
LYMPHOCYTES NFR BLD AUTO: 24 %
MCH RBC QN AUTO: 31.6 PG (ref 26.6–33)
MCHC RBC AUTO-ENTMCNC: 33 G/DL (ref 31.5–35.7)
MCV RBC AUTO: 96 FL (ref 79–97)
MICROALBUMIN UR-MCNC: <3 UG/ML
MONOCYTES # BLD AUTO: 0.5 X10E3/UL (ref 0.1–0.9)
MONOCYTES NFR BLD AUTO: 10 %
NEUTROPHILS # BLD AUTO: 3 X10E3/UL (ref 1.4–7)
NEUTROPHILS NFR BLD AUTO: 63 %
PLATELET # BLD AUTO: 253 X10E3/UL (ref 150–450)
POTASSIUM SERPL-SCNC: 4.6 MMOL/L (ref 3.5–5.2)
PROT SERPL-MCNC: 6.9 G/DL (ref 6–8.5)
RBC # BLD AUTO: 4.65 X10E6/UL (ref 4.14–5.8)
SODIUM SERPL-SCNC: 141 MMOL/L (ref 134–144)
TRIGL SERPL-MCNC: 59 MG/DL (ref 0–149)
VIT B12 SERPL-MCNC: 442 PG/ML (ref 232–1245)
VLDLC SERPL CALC-MCNC: 12 MG/DL (ref 5–40)
WBC # BLD AUTO: 4.8 X10E3/UL (ref 3.4–10.8)

## 2022-09-28 DIAGNOSIS — E11.9 TYPE 2 DIABETES MELLITUS WITHOUT COMPLICATION, WITHOUT LONG-TERM CURRENT USE OF INSULIN (HCC): ICD-10-CM

## 2022-09-28 RX ORDER — PIOGLITAZONEHYDROCHLORIDE 30 MG/1
TABLET ORAL
Qty: 90 TABLET | Refills: 3 | Status: SHIPPED | OUTPATIENT
Start: 2022-09-28

## 2022-10-01 NOTE — PROGRESS NOTES
Please call patient let him know his lab results:     1) labs were positive for borderline prediabetes. This the patient currently sugars in his diet. As well as losing weight. 2) the rest of his labs are essentially normal including normal liver, kidneys, electrolytes, cholesterol, B12, and blood cells.

## 2022-12-29 RX ORDER — ERGOCALCIFEROL 1.25 MG/1
CAPSULE ORAL
Qty: 3 CAPSULE | Refills: 0 | Status: SHIPPED | OUTPATIENT
Start: 2022-12-29

## 2022-12-29 RX ORDER — ATORVASTATIN CALCIUM 80 MG/1
TABLET, FILM COATED ORAL
Qty: 90 TABLET | Refills: 3 | Status: SHIPPED | OUTPATIENT
Start: 2022-12-29

## 2023-06-20 RX ORDER — ERGOCALCIFEROL 1.25 MG/1
CAPSULE ORAL
Qty: 12 CAPSULE | Refills: 1 | Status: SHIPPED | OUTPATIENT
Start: 2023-06-20

## 2023-12-27 ENCOUNTER — OFFICE VISIT (OUTPATIENT)
Facility: CLINIC | Age: 78
End: 2023-12-27
Payer: MEDICARE

## 2023-12-27 VITALS
BODY MASS INDEX: 30.49 KG/M2 | SYSTOLIC BLOOD PRESSURE: 118 MMHG | WEIGHT: 213 LBS | HEIGHT: 70 IN | OXYGEN SATURATION: 97 % | HEART RATE: 91 BPM | DIASTOLIC BLOOD PRESSURE: 78 MMHG | RESPIRATION RATE: 16 BRPM

## 2023-12-27 DIAGNOSIS — H93.8X3 POPPING OF BOTH EARS: ICD-10-CM

## 2023-12-27 DIAGNOSIS — M17.11 PRIMARY OSTEOARTHRITIS OF RIGHT KNEE: ICD-10-CM

## 2023-12-27 DIAGNOSIS — Z13.228 ENCOUNTER FOR SCREENING FOR OTHER METABOLIC DISORDERS: ICD-10-CM

## 2023-12-27 DIAGNOSIS — I25.10 CORONARY ARTERIOSCLEROSIS: ICD-10-CM

## 2023-12-27 DIAGNOSIS — R32 URINARY INCONTINENCE, UNSPECIFIED TYPE: ICD-10-CM

## 2023-12-27 DIAGNOSIS — D64.9 ANEMIA, UNSPECIFIED TYPE: ICD-10-CM

## 2023-12-27 DIAGNOSIS — I10 HYPERTENSION, UNSPECIFIED TYPE: ICD-10-CM

## 2023-12-27 DIAGNOSIS — E55.9 VITAMIN D DEFICIENCY DISEASE: ICD-10-CM

## 2023-12-27 DIAGNOSIS — E53.8 VITAMIN B12 DEFICIENCY (NON ANEMIC): ICD-10-CM

## 2023-12-27 DIAGNOSIS — H65.93 FLUID LEVEL BEHIND TYMPANIC MEMBRANE OF BOTH EARS: ICD-10-CM

## 2023-12-27 DIAGNOSIS — E78.2 MIXED HYPERLIPIDEMIA: ICD-10-CM

## 2023-12-27 DIAGNOSIS — Z90.79 H/O PROSTATECTOMY: ICD-10-CM

## 2023-12-27 DIAGNOSIS — Z13.0 SCREENING FOR DEFICIENCY ANEMIA: ICD-10-CM

## 2023-12-27 DIAGNOSIS — E11.9 TYPE 2 DIABETES MELLITUS WITHOUT COMPLICATION, WITHOUT LONG-TERM CURRENT USE OF INSULIN (HCC): Primary | ICD-10-CM

## 2023-12-27 PROBLEM — N20.0 KIDNEY STONE: Status: RESOLVED | Noted: 2018-10-01 | Resolved: 2023-12-27

## 2023-12-27 PROBLEM — D07.5 CARCINOMA IN SITU OF PROSTATE: Status: ACTIVE | Noted: 2023-12-27

## 2023-12-27 PROCEDURE — 3044F HG A1C LEVEL LT 7.0%: CPT | Performed by: NURSE PRACTITIONER

## 2023-12-27 PROCEDURE — G8427 DOCREV CUR MEDS BY ELIG CLIN: HCPCS | Performed by: NURSE PRACTITIONER

## 2023-12-27 PROCEDURE — G8484 FLU IMMUNIZE NO ADMIN: HCPCS | Performed by: NURSE PRACTITIONER

## 2023-12-27 PROCEDURE — 1036F TOBACCO NON-USER: CPT | Performed by: NURSE PRACTITIONER

## 2023-12-27 PROCEDURE — 99214 OFFICE O/P EST MOD 30 MIN: CPT | Performed by: NURSE PRACTITIONER

## 2023-12-27 PROCEDURE — 3078F DIAST BP <80 MM HG: CPT | Performed by: NURSE PRACTITIONER

## 2023-12-27 PROCEDURE — 1123F ACP DISCUSS/DSCN MKR DOCD: CPT | Performed by: NURSE PRACTITIONER

## 2023-12-27 PROCEDURE — G8417 CALC BMI ABV UP PARAM F/U: HCPCS | Performed by: NURSE PRACTITIONER

## 2023-12-27 PROCEDURE — 3074F SYST BP LT 130 MM HG: CPT | Performed by: NURSE PRACTITIONER

## 2023-12-28 LAB
25(OH)D3+25(OH)D2 SERPL-MCNC: 38.7 NG/ML (ref 30–100)
ALBUMIN SERPL-MCNC: 4.3 G/DL (ref 3.8–4.8)
ALBUMIN/GLOB SERPL: 2 {RATIO} (ref 1.2–2.2)
ALP SERPL-CCNC: 66 IU/L (ref 44–121)
ALT SERPL-CCNC: 18 IU/L (ref 0–44)
AST SERPL-CCNC: 22 IU/L (ref 0–40)
BASOPHILS # BLD AUTO: 0.1 X10E3/UL (ref 0–0.2)
BASOPHILS NFR BLD AUTO: 1 %
BILIRUB SERPL-MCNC: 0.5 MG/DL (ref 0–1.2)
BUN SERPL-MCNC: 10 MG/DL (ref 8–27)
BUN/CREAT SERPL: 11 (ref 10–24)
CALCIUM SERPL-MCNC: 9.5 MG/DL (ref 8.6–10.2)
CHLORIDE SERPL-SCNC: 102 MMOL/L (ref 96–106)
CHOLEST SERPL-MCNC: 169 MG/DL (ref 100–199)
CO2 SERPL-SCNC: 25 MMOL/L (ref 20–29)
CREAT SERPL-MCNC: 0.89 MG/DL (ref 0.76–1.27)
EGFRCR SERPLBLD CKD-EPI 2021: 87 ML/MIN/1.73
EOSINOPHIL # BLD AUTO: 0.1 X10E3/UL (ref 0–0.4)
EOSINOPHIL NFR BLD AUTO: 3 %
ERYTHROCYTE [DISTWIDTH] IN BLOOD BY AUTOMATED COUNT: 13.4 % (ref 11.6–15.4)
GLOBULIN SER CALC-MCNC: 2.2 G/DL (ref 1.5–4.5)
GLUCOSE SERPL-MCNC: 93 MG/DL (ref 70–99)
HBA1C MFR BLD: 5.7 % (ref 4.8–5.6)
HCT VFR BLD AUTO: 46.6 % (ref 37.5–51)
HDLC SERPL-MCNC: 55 MG/DL
HGB BLD-MCNC: 15.5 G/DL (ref 13–17.7)
IMM GRANULOCYTES # BLD AUTO: 0 X10E3/UL (ref 0–0.1)
IMM GRANULOCYTES NFR BLD AUTO: 0 %
LDLC SERPL CALC-MCNC: 99 MG/DL (ref 0–99)
LYMPHOCYTES # BLD AUTO: 1.1 X10E3/UL (ref 0.7–3.1)
LYMPHOCYTES NFR BLD AUTO: 24 %
MCH RBC QN AUTO: 30.3 PG (ref 26.6–33)
MCHC RBC AUTO-ENTMCNC: 33.3 G/DL (ref 31.5–35.7)
MCV RBC AUTO: 91 FL (ref 79–97)
MONOCYTES # BLD AUTO: 0.5 X10E3/UL (ref 0.1–0.9)
MONOCYTES NFR BLD AUTO: 11 %
NEUTROPHILS # BLD AUTO: 2.7 X10E3/UL (ref 1.4–7)
NEUTROPHILS NFR BLD AUTO: 61 %
PLATELET # BLD AUTO: 233 X10E3/UL (ref 150–450)
POTASSIUM SERPL-SCNC: 4.3 MMOL/L (ref 3.5–5.2)
PROT SERPL-MCNC: 6.5 G/DL (ref 6–8.5)
PSA SERPL-MCNC: <0.1 NG/ML (ref 0–4)
RBC # BLD AUTO: 5.11 X10E6/UL (ref 4.14–5.8)
REFLEX CRITERIA: NORMAL
SODIUM SERPL-SCNC: 143 MMOL/L (ref 134–144)
TRIGL SERPL-MCNC: 80 MG/DL (ref 0–149)
VIT B12 SERPL-MCNC: 521 PG/ML (ref 232–1245)
VLDLC SERPL CALC-MCNC: 15 MG/DL (ref 5–40)
WBC # BLD AUTO: 4.5 X10E3/UL (ref 3.4–10.8)

## 2024-03-27 RX ORDER — ATORVASTATIN CALCIUM 80 MG/1
80 TABLET, FILM COATED ORAL DAILY
Qty: 90 TABLET | Refills: 1 | Status: SHIPPED | OUTPATIENT
Start: 2024-03-27

## 2024-03-27 NOTE — TELEPHONE ENCOUNTER
Future Appointments  Comment3/27/2024 - 3/27/2026   Date Visit Type Department Provider    8/14/2024  9:00 AM MEDICARE ANNUAL WELL VISIT [facility] Kalin Schumacher, APRN - NP   Appointment Notes:   Return in about 6 months (around 6/27/2024) for medicare wellness.

## 2024-05-24 RX ORDER — PIOGLITAZONEHYDROCHLORIDE 30 MG/1
TABLET ORAL
Qty: 90 TABLET | Refills: 0 | Status: SHIPPED | OUTPATIENT
Start: 2024-05-24

## 2024-05-24 NOTE — TELEPHONE ENCOUNTER
Disp Refills Start End    pioglitazone (ACTOS) 30 mg tablet 90 Tablet 3 9/28/2022 --    Sig: take 1 tablet by mouth once daily PLEASE MAKE APPOINTMENT WITH OFFICE    Sent to pharmacy as: pioglitazone 30 mg tablet (ACTOS)    E-Prescribing Status: Receipt confirmed by pharmacy (9/28/2022  5:13 PM EDT)    By Dr. Giles

## 2024-05-24 NOTE — TELEPHONE ENCOUNTER
Future Appointments  Comment5/24/2024 - 5/24/2026   Date Visit Type Department Provider    8/23/2024  8:00 AM MEDICARE ANNUAL WELL VISIT [facility] Ruma Warren, APRN - NP   Appointment Notes:   Return in about 6 months (around 6/27/2024) for medicare wellness.

## 2024-08-15 DIAGNOSIS — E11.9 TYPE 2 DIABETES MELLITUS WITHOUT COMPLICATION, WITHOUT LONG-TERM CURRENT USE OF INSULIN (HCC): Primary | ICD-10-CM

## 2024-08-15 NOTE — TELEPHONE ENCOUNTER
Future Appointments  8/15/2024 - 8/15/2026   Date Visit Type Department Provider    8/23/2024  8:00 AM MEDICARE ANNUAL WELL VISIT [facility] Ruma Warren, APRN - NP   Appointment Notes:   Return in about 6 months (around 6/27/2024) for medicare wellness.May be due for  diabetic urine ACR test and  diabetic eGFR test

## 2024-08-16 RX ORDER — PIOGLITAZONEHYDROCHLORIDE 30 MG/1
TABLET ORAL
Qty: 90 TABLET | Refills: 0 | Status: SHIPPED | OUTPATIENT
Start: 2024-08-16

## 2024-08-23 ENCOUNTER — OFFICE VISIT (OUTPATIENT)
Facility: CLINIC | Age: 79
End: 2024-08-23
Payer: MEDICARE

## 2024-08-23 VITALS
OXYGEN SATURATION: 98 % | BODY MASS INDEX: 29.12 KG/M2 | WEIGHT: 203.38 LBS | TEMPERATURE: 97.5 F | DIASTOLIC BLOOD PRESSURE: 90 MMHG | HEART RATE: 97 BPM | RESPIRATION RATE: 16 BRPM | HEIGHT: 70 IN | SYSTOLIC BLOOD PRESSURE: 140 MMHG

## 2024-08-23 DIAGNOSIS — E55.9 VITAMIN D DEFICIENCY DISEASE: ICD-10-CM

## 2024-08-23 DIAGNOSIS — Z86.79 HISTORY OF HYPERTENSION: ICD-10-CM

## 2024-08-23 DIAGNOSIS — E11.65 CONTROLLED TYPE 2 DIABETES MELLITUS WITH HYPERGLYCEMIA, WITHOUT LONG-TERM CURRENT USE OF INSULIN (HCC): ICD-10-CM

## 2024-08-23 DIAGNOSIS — R63.0 LOSS OF APPETITE: ICD-10-CM

## 2024-08-23 DIAGNOSIS — Z28.20 VACCINE REFUSED BY PATIENT: ICD-10-CM

## 2024-08-23 DIAGNOSIS — Z86.79 HISTORY OF CHF (CONGESTIVE HEART FAILURE): Primary | ICD-10-CM

## 2024-08-23 DIAGNOSIS — E78.2 MIXED HYPERLIPIDEMIA: ICD-10-CM

## 2024-08-23 DIAGNOSIS — E53.8 VITAMIN B12 DEFICIENCY (NON ANEMIC): ICD-10-CM

## 2024-08-23 DIAGNOSIS — R53.1 GENERALIZED WEAKNESS: ICD-10-CM

## 2024-08-23 PROBLEM — H65.93 FLUID LEVEL BEHIND TYMPANIC MEMBRANE OF BOTH EARS: Status: RESOLVED | Noted: 2023-12-27 | Resolved: 2024-08-23

## 2024-08-23 PROBLEM — H93.8X3 POPPING OF BOTH EARS: Status: RESOLVED | Noted: 2023-12-27 | Resolved: 2024-08-23

## 2024-08-23 PROBLEM — Z13.228 ENCOUNTER FOR SCREENING FOR OTHER METABOLIC DISORDERS: Status: RESOLVED | Noted: 2023-12-27 | Resolved: 2024-08-23

## 2024-08-23 PROBLEM — D64.9 ANEMIA: Status: RESOLVED | Noted: 2023-12-27 | Resolved: 2024-08-23

## 2024-08-23 PROCEDURE — G8417 CALC BMI ABV UP PARAM F/U: HCPCS | Performed by: NURSE PRACTITIONER

## 2024-08-23 PROCEDURE — 1036F TOBACCO NON-USER: CPT | Performed by: NURSE PRACTITIONER

## 2024-08-23 PROCEDURE — G8427 DOCREV CUR MEDS BY ELIG CLIN: HCPCS | Performed by: NURSE PRACTITIONER

## 2024-08-23 PROCEDURE — 99214 OFFICE O/P EST MOD 30 MIN: CPT | Performed by: NURSE PRACTITIONER

## 2024-08-23 PROCEDURE — 1123F ACP DISCUSS/DSCN MKR DOCD: CPT | Performed by: NURSE PRACTITIONER

## 2024-08-23 RX ORDER — GLUCOSAMINE HCL/CHONDROITIN SU 500-400 MG
CAPSULE ORAL
Qty: 100 STRIP | Refills: 3 | Status: SHIPPED | OUTPATIENT
Start: 2024-08-23

## 2024-08-23 RX ORDER — LANCETS 30 GAUGE
EACH MISCELLANEOUS
Qty: 100 EACH | Refills: 3 | Status: SHIPPED | OUTPATIENT
Start: 2024-08-23

## 2024-08-23 SDOH — ECONOMIC STABILITY: FOOD INSECURITY: WITHIN THE PAST 12 MONTHS, YOU WORRIED THAT YOUR FOOD WOULD RUN OUT BEFORE YOU GOT MONEY TO BUY MORE.: NEVER TRUE

## 2024-08-23 SDOH — ECONOMIC STABILITY: FOOD INSECURITY: WITHIN THE PAST 12 MONTHS, THE FOOD YOU BOUGHT JUST DIDN'T LAST AND YOU DIDN'T HAVE MONEY TO GET MORE.: NEVER TRUE

## 2024-08-23 SDOH — ECONOMIC STABILITY: INCOME INSECURITY: HOW HARD IS IT FOR YOU TO PAY FOR THE VERY BASICS LIKE FOOD, HOUSING, MEDICAL CARE, AND HEATING?: NOT HARD AT ALL

## 2024-08-23 ASSESSMENT — PATIENT HEALTH QUESTIONNAIRE - PHQ9
SUM OF ALL RESPONSES TO PHQ QUESTIONS 1-9: 0
SUM OF ALL RESPONSES TO PHQ QUESTIONS 1-9: 0
2. FEELING DOWN, DEPRESSED OR HOPELESS: NOT AT ALL
1. LITTLE INTEREST OR PLEASURE IN DOING THINGS: NOT AT ALL
SUM OF ALL RESPONSES TO PHQ QUESTIONS 1-9: 0
SUM OF ALL RESPONSES TO PHQ QUESTIONS 1-9: 0
SUM OF ALL RESPONSES TO PHQ9 QUESTIONS 1 & 2: 0

## 2024-08-23 NOTE — PROGRESS NOTES
Chief Complaint   Patient presents with    OTHER     [facility], loss of appetite    Follow-up     DM     \"Have you been to the ER, urgent care clinic since your last visit?  Hospitalized since your last visit?\"    YES - When: approximately 2  weeks ago.  [facility]    “Have you seen or consulted any other health care providers outside of [facility] since your last visit?”    NO            Click Here for Release of Records Request   PHQ-9 Total Score: 0 (8/23/2024  7:58 AM)           
been generated using voice recognition software. Despite editing, there may be some syntax errors.    Follow-up and Dispositions    Return in about 6 months (around 2/23/2025) for AWV, fasting labs, follow up, chronic conditions/meds.         I have discussed the diagnosis with the patient and the intended plan as seen in the above orders. The patient has received an after-visit summary and questions were answered concerning future plans.  I have discussed medication side effects and warnings with the patient as well.    Ruma Warren, GISELLE - NP

## 2024-08-24 LAB
25(OH)D3+25(OH)D2 SERPL-MCNC: 51.3 NG/ML (ref 30–100)
ALBUMIN SERPL-MCNC: 4 G/DL (ref 3.8–4.8)
ALP SERPL-CCNC: 106 IU/L (ref 44–121)
ALT SERPL-CCNC: 11 IU/L (ref 0–44)
AST SERPL-CCNC: 16 IU/L (ref 0–40)
BASOPHILS # BLD AUTO: 0.1 X10E3/UL (ref 0–0.2)
BASOPHILS NFR BLD AUTO: 1 %
BILIRUB SERPL-MCNC: 0.6 MG/DL (ref 0–1.2)
BUN SERPL-MCNC: 13 MG/DL (ref 8–27)
BUN/CREAT SERPL: 15 (ref 10–24)
CALCIUM SERPL-MCNC: 9.6 MG/DL (ref 8.6–10.2)
CHLORIDE SERPL-SCNC: 100 MMOL/L (ref 96–106)
CO2 SERPL-SCNC: 27 MMOL/L (ref 20–29)
CREAT SERPL-MCNC: 0.89 MG/DL (ref 0.76–1.27)
EGFRCR SERPLBLD CKD-EPI 2021: 87 ML/MIN/1.73
EOSINOPHIL # BLD AUTO: 0 X10E3/UL (ref 0–0.4)
EOSINOPHIL NFR BLD AUTO: 1 %
ERYTHROCYTE [DISTWIDTH] IN BLOOD BY AUTOMATED COUNT: 12.9 % (ref 11.6–15.4)
GLOBULIN SER CALC-MCNC: 3.1 G/DL (ref 1.5–4.5)
GLUCOSE SERPL-MCNC: 98 MG/DL (ref 70–99)
HBA1C MFR BLD: 6 % (ref 4.8–5.6)
HCT VFR BLD AUTO: 43.6 % (ref 37.5–51)
HGB BLD-MCNC: 14.7 G/DL (ref 13–17.7)
IMM GRANULOCYTES # BLD AUTO: 0 X10E3/UL (ref 0–0.1)
IMM GRANULOCYTES NFR BLD AUTO: 1 %
LYMPHOCYTES # BLD AUTO: 1.2 X10E3/UL (ref 0.7–3.1)
LYMPHOCYTES NFR BLD AUTO: 14 %
MCH RBC QN AUTO: 30.9 PG (ref 26.6–33)
MCHC RBC AUTO-ENTMCNC: 33.7 G/DL (ref 31.5–35.7)
MCV RBC AUTO: 92 FL (ref 79–97)
MONOCYTES # BLD AUTO: 0.6 X10E3/UL (ref 0.1–0.9)
MONOCYTES NFR BLD AUTO: 7 %
NEUTROPHILS # BLD AUTO: 6.6 X10E3/UL (ref 1.4–7)
NEUTROPHILS NFR BLD AUTO: 76 %
PLATELET # BLD AUTO: 465 X10E3/UL (ref 150–450)
POTASSIUM SERPL-SCNC: 3.9 MMOL/L (ref 3.5–5.2)
PROT SERPL-MCNC: 7.1 G/DL (ref 6–8.5)
RBC # BLD AUTO: 4.76 X10E6/UL (ref 4.14–5.8)
SODIUM SERPL-SCNC: 143 MMOL/L (ref 134–144)
TSH SERPL DL<=0.005 MIU/L-ACNC: 4.03 UIU/ML (ref 0.45–4.5)
VIT B12 SERPL-MCNC: >2000 PG/ML (ref 232–1245)
WBC # BLD AUTO: 8.5 X10E3/UL (ref 3.4–10.8)

## 2024-08-25 DIAGNOSIS — D75.839 THROMBOCYTOSIS: ICD-10-CM

## 2024-08-25 DIAGNOSIS — E11.65 CONTROLLED TYPE 2 DIABETES MELLITUS WITH HYPERGLYCEMIA, WITHOUT LONG-TERM CURRENT USE OF INSULIN (HCC): Primary | ICD-10-CM

## 2024-08-25 DIAGNOSIS — R80.9 DIABETES MELLITUS DUE TO UNDERLYING CONDITION, WITH DIABETIC MICROALBUMINURIA, WITHOUT LONG-TERM CURRENT USE OF INSULIN (HCC): ICD-10-CM

## 2024-08-25 DIAGNOSIS — E08.29 DIABETES MELLITUS DUE TO UNDERLYING CONDITION, WITH DIABETIC MICROALBUMINURIA, WITHOUT LONG-TERM CURRENT USE OF INSULIN (HCC): ICD-10-CM

## 2024-08-25 LAB
ALBUMIN/CREAT UR: 60 MG/G CREAT (ref 0–29)
CREAT UR-MCNC: 170.9 MG/DL
MICROALBUMIN UR-MCNC: 103.3 UG/ML

## 2024-09-06 DIAGNOSIS — I25.10 CORONARY ARTERIOSCLEROSIS: ICD-10-CM

## 2024-09-06 NOTE — TELEPHONE ENCOUNTER
Future Appointments  9/6/2024 - 9/6/2026   Date Visit Type Department Provider    3/5/2025  9:00 AM MEDICARE ANNUAL WELL VISIT [facility] Ruma Warren, GISELLE - NP   Appointment Notes:   Return in about 6 months (around 2/23/2025) for AWV, fasting labs, follow up, chronic conditions/meds

## 2024-09-08 DIAGNOSIS — D75.839 THROMBOCYTOSIS: ICD-10-CM

## 2024-09-08 RX ORDER — NITROGLYCERIN 0.4 MG/1
0.4 TABLET SUBLINGUAL EVERY 5 MIN PRN
Qty: 25 TABLET | Refills: 5 | Status: SHIPPED | OUTPATIENT
Start: 2024-09-08

## 2024-09-09 ENCOUNTER — TELEPHONE (OUTPATIENT)
Facility: CLINIC | Age: 79
End: 2024-09-09

## 2024-09-12 RX ORDER — ATORVASTATIN CALCIUM 80 MG/1
80 TABLET, FILM COATED ORAL DAILY
Qty: 90 TABLET | Refills: 1 | Status: SHIPPED | OUTPATIENT
Start: 2024-09-12

## 2025-03-03 ENCOUNTER — TELEPHONE (OUTPATIENT)
Facility: CLINIC | Age: 80
End: 2025-03-03

## 2025-03-03 NOTE — TELEPHONE ENCOUNTER
Attempted to contact patient regarding upcoming Medicare wellness appointment and completion of HRA questionnaire. LVM for patient to please return call at [phone].

## 2025-03-04 ENCOUNTER — TELEPHONE (OUTPATIENT)
Facility: CLINIC | Age: 80
End: 2025-03-04

## 2025-03-04 SDOH — HEALTH STABILITY: PHYSICAL HEALTH: ON AVERAGE, HOW MANY DAYS PER WEEK DO YOU ENGAGE IN MODERATE TO STRENUOUS EXERCISE (LIKE A BRISK WALK)?: 5 DAYS

## 2025-03-04 SDOH — HEALTH STABILITY: PHYSICAL HEALTH: ON AVERAGE, HOW MANY MINUTES DO YOU ENGAGE IN EXERCISE AT THIS LEVEL?: 20 MIN

## 2025-03-04 ASSESSMENT — LIFESTYLE VARIABLES
HOW OFTEN DURING THE LAST YEAR HAVE YOU HAD A FEELING OF GUILT OR REMORSE AFTER DRINKING: NEVER
HAS A RELATIVE, FRIEND, DOCTOR, OR ANOTHER HEALTH PROFESSIONAL EXPRESSED CONCERN ABOUT YOUR DRINKING OR SUGGESTED YOU CUT DOWN: NO
HOW OFTEN DO YOU HAVE A DRINK CONTAINING ALCOHOL: 5
HOW OFTEN DURING THE LAST YEAR HAVE YOU FOUND THAT YOU WERE NOT ABLE TO STOP DRINKING ONCE YOU HAD STARTED: NEVER
HOW OFTEN DURING THE LAST YEAR HAVE YOU NEEDED AN ALCOHOLIC DRINK FIRST THING IN THE MORNING TO GET YOURSELF GOING AFTER A NIGHT OF HEAVY DRINKING: NEVER
HAVE YOU OR SOMEONE ELSE BEEN INJURED AS A RESULT OF YOUR DRINKING: NO
HAS A RELATIVE, FRIEND, DOCTOR, OR ANOTHER HEALTH PROFESSIONAL EXPRESSED CONCERN ABOUT YOUR DRINKING OR SUGGESTED YOU CUT DOWN: NO
HOW MANY STANDARD DRINKS CONTAINING ALCOHOL DO YOU HAVE ON A TYPICAL DAY: 1 OR 2
HOW OFTEN DURING THE LAST YEAR HAVE YOU NEEDED AN ALCOHOLIC DRINK FIRST THING IN THE MORNING TO GET YOURSELF GOING AFTER A NIGHT OF HEAVY DRINKING: NEVER
HOW OFTEN DO YOU HAVE A DRINK CONTAINING ALCOHOL: 4 OR MORE TIMES A WEEK
HOW MANY STANDARD DRINKS CONTAINING ALCOHOL DO YOU HAVE ON A TYPICAL DAY: 1
HOW OFTEN DO YOU HAVE SIX OR MORE DRINKS ON ONE OCCASION: 1
HOW OFTEN DURING THE LAST YEAR HAVE YOU FOUND THAT YOU WERE NOT ABLE TO STOP DRINKING ONCE YOU HAD STARTED: NEVER
HOW OFTEN DURING THE LAST YEAR HAVE YOU BEEN UNABLE TO REMEMBER WHAT HAPPENED THE NIGHT BEFORE BECAUSE YOU HAD BEEN DRINKING: NEVER
HOW OFTEN DURING THE LAST YEAR HAVE YOU HAD A FEELING OF GUILT OR REMORSE AFTER DRINKING: NEVER
HAVE YOU OR SOMEONE ELSE BEEN INJURED AS A RESULT OF YOUR DRINKING: NO
HOW OFTEN DURING THE LAST YEAR HAVE YOU FAILED TO DO WHAT WAS NORMALLY EXPECTED FROM YOU BECAUSE OF DRINKING: NEVER
HOW OFTEN DURING THE LAST YEAR HAVE YOU BEEN UNABLE TO REMEMBER WHAT HAPPENED THE NIGHT BEFORE BECAUSE YOU HAD BEEN DRINKING: NEVER
HOW OFTEN DURING THE LAST YEAR HAVE YOU FAILED TO DO WHAT WAS NORMALLY EXPECTED FROM YOU BECAUSE OF DRINKING: NEVER

## 2025-03-04 ASSESSMENT — PATIENT HEALTH QUESTIONNAIRE - PHQ9
1. LITTLE INTEREST OR PLEASURE IN DOING THINGS: NOT AT ALL
SUM OF ALL RESPONSES TO PHQ QUESTIONS 1-9: 0
SUM OF ALL RESPONSES TO PHQ QUESTIONS 1-9: 0
2. FEELING DOWN, DEPRESSED OR HOPELESS: NOT AT ALL
SUM OF ALL RESPONSES TO PHQ QUESTIONS 1-9: 0
SUM OF ALL RESPONSES TO PHQ QUESTIONS 1-9: 0

## 2025-03-05 ENCOUNTER — OFFICE VISIT (OUTPATIENT)
Facility: CLINIC | Age: 80
End: 2025-03-05

## 2025-03-05 VITALS
HEART RATE: 70 BPM | DIASTOLIC BLOOD PRESSURE: 68 MMHG | HEIGHT: 70 IN | SYSTOLIC BLOOD PRESSURE: 120 MMHG | RESPIRATION RATE: 16 BRPM | BODY MASS INDEX: 30.78 KG/M2 | WEIGHT: 215 LBS | TEMPERATURE: 97.5 F | OXYGEN SATURATION: 97 %

## 2025-03-05 DIAGNOSIS — Z28.20 VACCINE REFUSED BY PATIENT: ICD-10-CM

## 2025-03-05 DIAGNOSIS — E66.09 CLASS 1 OBESITY DUE TO EXCESS CALORIES WITH SERIOUS COMORBIDITY AND BODY MASS INDEX (BMI) OF 31.0 TO 31.9 IN ADULT: ICD-10-CM

## 2025-03-05 DIAGNOSIS — E55.9 VITAMIN D DEFICIENCY DISEASE: ICD-10-CM

## 2025-03-05 DIAGNOSIS — E11.29 CONTROLLED TYPE 2 DIABETES MELLITUS WITH MICROALBUMINURIA, WITHOUT LONG-TERM CURRENT USE OF INSULIN (HCC): ICD-10-CM

## 2025-03-05 DIAGNOSIS — Z00.00 MEDICARE ANNUAL WELLNESS VISIT, SUBSEQUENT: Primary | ICD-10-CM

## 2025-03-05 DIAGNOSIS — R80.9 CONTROLLED TYPE 2 DIABETES MELLITUS WITH MICROALBUMINURIA, WITHOUT LONG-TERM CURRENT USE OF INSULIN (HCC): ICD-10-CM

## 2025-03-05 DIAGNOSIS — E53.8 VITAMIN B12 DEFICIENCY (NON ANEMIC): ICD-10-CM

## 2025-03-05 DIAGNOSIS — E78.2 MIXED HYPERLIPIDEMIA: ICD-10-CM

## 2025-03-05 DIAGNOSIS — E66.811 CLASS 1 OBESITY DUE TO EXCESS CALORIES WITH SERIOUS COMORBIDITY AND BODY MASS INDEX (BMI) OF 31.0 TO 31.9 IN ADULT: ICD-10-CM

## 2025-03-05 NOTE — PATIENT INSTRUCTIONS
(www.[email or web address]/planning/advance-directives/).  Follow-up care is a key part of your treatment and safety. Be sure to make and go to all appointments, and call your doctor if you are having problems. It's also a good idea to know your test results and keep a list of the medicines you take.  What should you include in an advance directive?  Many states have a unique advance directive form. (It may ask you to address specific issues.) Or you might use a universal form that's approved by many states.  If your form doesn't tell you what to address, it may be hard to know what to include in your advance directive. Use the questions below to help you get started.  Who do you want to make decisions about your medical care if you are not able to?  What life-support measures do you want if you have a serious illness that gets worse over time or can't be cured?  What are you most afraid of that might happen? (Maybe you're afraid of having pain, losing your independence, or being kept alive by machines.)  Where would you prefer to die? (Your home? A hospital? A nursing home?)  Do you want to donate your organs when you die?  Do you want certain Jain practices performed before you die?  When should you call for help?  Be sure to contact your doctor if you have any questions.  Where can you learn more?  Go to https://www.[employer].net/patientEd and enter R264 to learn more about \"Advance Directives: Care Instructions.\"  Current as of: November 16, 2023  Content Version: 14.3  © 2024 [employer].   Care instructions adapted under license by [employer]. If you have questions about a medical condition or this instruction, always ask your healthcare professional. [employer] disclaims any warranty or liability for your use of this information.         A Healthy Heart: Care Instructions  Overview     Coronary artery disease, also called heart disease, occurs when a substance called plaque builds up

## 2025-03-05 NOTE — PROGRESS NOTES
Medicare Annual Wellness Visit    Inocente Pradhan is here for Medicare AWV    Assessment & Plan   Medicare annual wellness visit, subsequent    Vaccine refused by patient  He declines all vaccines.  He verbalizes understanding of the risks.    Class 1 obesity due to excess calories with serious comorbidity and body mass index (BMI) of 31.0 to 31.9 in adult  Continue to stay active and avoid sitting for long periods of time.  Eat a healthy diet of lean proteins, fresh fruits and vegetables, and healthy fats.  Avoid sugars, carbs, and processed foods.    Controlled type 2 diabetes mellitus with microalbuminuria, without long-term current use of insulin (HCC)  -     Comprehensive Metabolic Panel  -     Hemoglobin A1C  Updating labs.  Continue current medication until results are received.  Reviewed positive lifestyle management.  Schedule diabetic eye exam.    Mixed hyperlipidemia  -     CBC with Auto Differential  -     Comprehensive Metabolic Panel  -     Lipid Panel  Annual labs.  Continue atorvastatin daily.  Reviewed lifestyle management.    Vitamin B12 deficiency (non anemic)  B12 above goal when checked 8/23/2024.  Continue daily supplement.    Vitamin D deficiency disease  Vitamin D above goal when checked 8/23/2024.  Continue monthly supplement.       Return in about 6 months (around 9/5/2025) for f/u chronic conditions, nonfasting, VV ok   AND   , AWV, f/u, fasting due 3/26.     Subjective   Presents for Medicare wellness and chronic disease management. Medications reviewed, taking as prescribed with no known side effects.  Follows with urology for PSA monitoring for h/o prostate cancer annually. Next visit 3/31/25. Declines to have PSA checked with labs today.   Follows with cardiology annually for CAD and h/o MI- aspirin. Next visit 3/14/25.    HLD- tolerates daily statin. Tries to eat lean proteins only.   CAD- has not needed Nitroglycerin.       Management of T2DM-  Current meds: Jardiance  Compliant with 
past 7 days, did you need help from others to perform any of the following everyday activities: Eating, dressing, grooming, bathing, toileting, or walking/balance? No    In the past 7 days, did you need help from others to take care of any of the following: Laundry, housekeeping, banking/finances, shopping, telephone use, food preparation, transportation, or taking medications? No    How often do you have a drink containing alcohol? 4 or more times a week    How many drinks containing alcohol do you have on a typical day when you are drinking? 1 or 2    How often do you have six or more drinks on one occasion? Never    During the past year, how often have you found that you were not able to stop drinking once you had started? Never    During the past year, how often have you failed to do what was normally expected of you because of drinking? Never    During the past year, how often have you needed a drink in the morning to get yourself going after a heavy drinking session? Never    During the past year, how often have you had a feeling of guilt or remorse after drinking? Never    During the past year, have you been unable to remember what happened the night before because you had been drinking? Never    Have you or someone else been injured as a result of your drinking? No    Has a relative or friend, doctor or health worker been concerned about your drinking or suggested you cut down? No    How many times in the past year have you used a recreational drug or used a prescription medication for nonmedical reasons? None    Over the last 2 weeks, how often have you been bothered by the following problems?     Little interest or pleasure in doing things Not at all    Feeling down, depressed, or hopeless Not at all    Calculation of PHQ 2 values (range: 0 - 6) 0               Click Here for Release of Records Request   PHQ-9 Total Score: 0 (3/4/2025 11:49 AM)

## 2025-03-06 LAB
ALBUMIN SERPL-MCNC: 4.2 G/DL (ref 3.7–4.7)
ALP SERPL-CCNC: 65 IU/L (ref 44–121)
ALT SERPL-CCNC: 20 IU/L (ref 0–44)
AST SERPL-CCNC: 23 IU/L (ref 0–40)
BASOPHILS # BLD AUTO: 0.1 X10E3/UL (ref 0–0.2)
BASOPHILS NFR BLD AUTO: 1 %
BILIRUB SERPL-MCNC: 0.8 MG/DL (ref 0–1.2)
BUN SERPL-MCNC: 16 MG/DL (ref 8–27)
BUN/CREAT SERPL: 17 (ref 10–24)
CALCIUM SERPL-MCNC: 9.3 MG/DL (ref 8.6–10.2)
CHLORIDE SERPL-SCNC: 104 MMOL/L (ref 96–106)
CHOLEST SERPL-MCNC: 145 MG/DL (ref 100–199)
CO2 SERPL-SCNC: 24 MMOL/L (ref 20–29)
CREAT SERPL-MCNC: 0.94 MG/DL (ref 0.76–1.27)
EGFRCR SERPLBLD CKD-EPI 2021: 81 ML/MIN/1.73
EOSINOPHIL # BLD AUTO: 0.1 X10E3/UL (ref 0–0.4)
EOSINOPHIL NFR BLD AUTO: 1 %
ERYTHROCYTE [DISTWIDTH] IN BLOOD BY AUTOMATED COUNT: 14 % (ref 11.6–15.4)
GLOBULIN SER CALC-MCNC: 2.1 G/DL (ref 1.5–4.5)
GLUCOSE SERPL-MCNC: 93 MG/DL (ref 70–99)
HBA1C MFR BLD: 5.6 % (ref 4.8–5.6)
HCT VFR BLD AUTO: 42.7 % (ref 37.5–51)
HDLC SERPL-MCNC: 68 MG/DL
HGB BLD-MCNC: 14.1 G/DL (ref 13–17.7)
IMM GRANULOCYTES # BLD AUTO: 0 X10E3/UL (ref 0–0.1)
IMM GRANULOCYTES NFR BLD AUTO: 0 %
LDLC SERPL CALC-MCNC: 65 MG/DL (ref 0–99)
LYMPHOCYTES # BLD AUTO: 1.3 X10E3/UL (ref 0.7–3.1)
LYMPHOCYTES NFR BLD AUTO: 28 %
MCH RBC QN AUTO: 30.7 PG (ref 26.6–33)
MCHC RBC AUTO-ENTMCNC: 33 G/DL (ref 31.5–35.7)
MCV RBC AUTO: 93 FL (ref 79–97)
MONOCYTES # BLD AUTO: 0.4 X10E3/UL (ref 0.1–0.9)
MONOCYTES NFR BLD AUTO: 10 %
NEUTROPHILS # BLD AUTO: 2.7 X10E3/UL (ref 1.4–7)
NEUTROPHILS NFR BLD AUTO: 60 %
PLATELET # BLD AUTO: 254 X10E3/UL (ref 150–450)
POTASSIUM SERPL-SCNC: 4.3 MMOL/L (ref 3.5–5.2)
PROT SERPL-MCNC: 6.3 G/DL (ref 6–8.5)
RBC # BLD AUTO: 4.59 X10E6/UL (ref 4.14–5.8)
SODIUM SERPL-SCNC: 144 MMOL/L (ref 134–144)
TRIGL SERPL-MCNC: 54 MG/DL (ref 0–149)
VLDLC SERPL CALC-MCNC: 12 MG/DL (ref 5–40)
WBC # BLD AUTO: 4.5 X10E3/UL (ref 3.4–10.8)

## 2025-03-07 RX ORDER — ATORVASTATIN CALCIUM 80 MG/1
80 TABLET, FILM COATED ORAL DAILY
Qty: 90 TABLET | Refills: 3 | Status: SHIPPED | OUTPATIENT
Start: 2025-03-07

## 2025-03-12 ENCOUNTER — RESULTS FOLLOW-UP (OUTPATIENT)
Facility: CLINIC | Age: 80
End: 2025-03-12

## 2025-04-07 ENCOUNTER — TELEPHONE (OUTPATIENT)
Facility: CLINIC | Age: 80
End: 2025-04-07

## 2025-04-07 DIAGNOSIS — E11.65 CONTROLLED TYPE 2 DIABETES MELLITUS WITH HYPERGLYCEMIA, WITHOUT LONG-TERM CURRENT USE OF INSULIN (HCC): ICD-10-CM

## 2025-04-07 DIAGNOSIS — E08.29 DIABETES MELLITUS DUE TO UNDERLYING CONDITION, WITH DIABETIC MICROALBUMINURIA, WITHOUT LONG-TERM CURRENT USE OF INSULIN (HCC): ICD-10-CM

## 2025-04-07 DIAGNOSIS — R80.9 DIABETES MELLITUS DUE TO UNDERLYING CONDITION, WITH DIABETIC MICROALBUMINURIA, WITHOUT LONG-TERM CURRENT USE OF INSULIN (HCC): ICD-10-CM

## 2025-04-07 NOTE — TELEPHONE ENCOUNTER
Patient's wife states that he saw Cardiology after it was stopped and they state he was ok to take the Actos.

## 2025-04-07 NOTE — TELEPHONE ENCOUNTER
Patient's wife, Xi, called to request a refill on her 's Pioglitazone (Actos) 30 MG tablets to be sent to [facility] at [address] [facility]. Patient is completely out of medication and has been for 2 or 3 days now.     Next appt: 09/17/2025

## 2025-04-07 NOTE — TELEPHONE ENCOUNTER
Actos was stopped in 2024 for concerns it was worsening heart failure.  He should only be taking Jardiance.

## 2025-04-09 NOTE — TELEPHONE ENCOUNTER
Patient advised. Patient does not want to take a different medication. Patient states that his cardiologist states its ok for him to take and patient states that he has been taking this for years and years and does not want to switch. Patient also states that if he can't get the actos filled he wont take nothing at all. Patient was very agitated.

## 2025-04-09 NOTE — TELEPHONE ENCOUNTER
Patient advised. Patient is agreeable in stopping the medication and will call us back around June to set up a lab appointment.

## 2025-04-09 NOTE — TELEPHONE ENCOUNTER
Actos is contraindicated based on his age, good control of diabetes, and heart failure.  I have resent Jardiance to his pharmacy.  If this is too expensive please ask him to call back for prescription of metformin.

## 2025-04-09 NOTE — TELEPHONE ENCOUNTER
Patient states that he does not take Jardiance, and does not recall ever taking it. Patient states that he only takes the Actos 30 mg once a day, and he has been out and today is the 5th day he's been out. Patient states please send in a refill. Patient was advised to bring all his medications to his next appointment.

## 2025-07-08 RX ORDER — ERGOCALCIFEROL 1.25 MG/1
CAPSULE, LIQUID FILLED ORAL
Qty: 12 CAPSULE | Refills: 1 | Status: CANCELLED | OUTPATIENT
Start: 2025-07-08

## 2025-07-08 RX ORDER — MULTIVIT-MIN/IRON/FOLIC ACID/K 18-600-40
2000 CAPSULE ORAL DAILY
Qty: 90 CAPSULE | Refills: 3 | Status: SHIPPED | OUTPATIENT
Start: 2025-07-08

## 2025-07-08 NOTE — TELEPHONE ENCOUNTER
Received a fax requesting a refill of   vitamin D (ERGOCALCIFEROL) 1.25 MG (23101 UT) CAPS capsule to be sent to [facility] pharmacy located at [address] [place] [address].    Last appt: 3/5/25  Next appt: 9/17/25

## 2025-07-09 DIAGNOSIS — E11.65 CONTROLLED TYPE 2 DIABETES MELLITUS WITH HYPERGLYCEMIA, WITHOUT LONG-TERM CURRENT USE OF INSULIN (HCC): ICD-10-CM
